# Patient Record
Sex: FEMALE | Race: BLACK OR AFRICAN AMERICAN
[De-identification: names, ages, dates, MRNs, and addresses within clinical notes are randomized per-mention and may not be internally consistent; named-entity substitution may affect disease eponyms.]

---

## 2019-07-02 ENCOUNTER — HOSPITAL ENCOUNTER (EMERGENCY)
Dept: HOSPITAL 62 - ER | Age: 39
Discharge: HOME | End: 2019-07-02
Payer: MEDICARE

## 2019-07-02 VITALS — SYSTOLIC BLOOD PRESSURE: 135 MMHG | DIASTOLIC BLOOD PRESSURE: 96 MMHG

## 2019-07-02 DIAGNOSIS — S05.02XA: Primary | ICD-10-CM

## 2019-07-02 DIAGNOSIS — I10: ICD-10-CM

## 2019-07-02 DIAGNOSIS — H10.9: ICD-10-CM

## 2019-07-02 DIAGNOSIS — E10.9: ICD-10-CM

## 2019-07-02 DIAGNOSIS — I25.10: ICD-10-CM
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
## 2019-07-02 DIAGNOSIS — J45.909: ICD-10-CM

## 2019-07-02 PROCEDURE — 99283 EMERGENCY DEPT VISIT LOW MDM: CPT

## 2019-07-02 NOTE — ER DOCUMENT REPORT
HPI





- HPI


Time Seen by Provider: 07/02/19 13:49


Pain Level: 2


Context: 





Patient is a 39-year-old female who presents the emergency department with left 

eye burning and light sensitivity.  She states about 4 days ago she noticed that

she had crust around her eyes.  Denies any injury.  She does have false 

eyelashes, but states that the burning started before she put her false 

eyelashes in.





- ROS


Notes: 





REVIEW OF SYSTEMS:





CONSTITUTIONAL :    Denies recent illness.  Denies recent unintentional weight 

loss.  Denies fever,  chills, or sweats. 


EENT: See HPI denies ear, throat, or mouth pain, discharge, or symptoms.  Denies

nasal or sinus congestion.


CARDIOVASCULAR:  Denies chest pain.


RESPIRATORY: Denies shortness of breath, cough, congestion, difficulty 

breathing, or wheezing. 


GASTROINTESTINAL: Denies nausea, vomiting, and diarrhea.  Denies abdominal pain.

 Denies constipation. 


GENITOURINARY:  Denies difficulty urinating, burning, blood in urine, urgency or

frequency.


MUSCULOSKELETAL:  Denies neck and back pain.  Denies joint pain or swelling.


SKIN:   Denies rash, itchiness, or lesions


HEMATOLOGIC :   Denies easy bruising or bleeding.


LYMPHATIC:  Denies swollen, painful, enlarged glands.


NEUROLOGICAL: Denies no numbness or tingling denies weakness.  Denies headache. 

Denies altered mental status.  Denies alteration in speech.


PSYCHIATRIC:  Denies stress, anxiety, alteration in sleep patterns, or 

depression.





All other systems reviewed and negative.





- REPRODUCTIVE


Reproductive: DENIES: Pregnant:





Past Medical History





- Social History


Smoking Status: Unknown if Ever Smoked


Family History: Reviewed & Not Pertinent, Other - Mother had migraines





- Past Medical History


Cardiac Medical History: Reports: Hx Coronary Artery Disease, Hx 

Hypercholesterolemia, Hx Hypertension


Pulmonary Medical History: Reports: Hx Asthma


Neurological Medical History: Reports: Hx Cerebrovascular Accident, Hx Migraine


Endocrine Medical History: Reports: Hx Diabetes Mellitus Type 1


Renal/ Medical History: Denies: Hx Peritoneal Dialysis


Skin Medical History: Reports Hx Eczema


Past Surgical History: Reports: Hx Oral Surgery.  Denies: Hx Hysterectomy





- Immunizations


Immunizations up to date: Yes


Hx Diphtheria, Pertussis, Tetanus Vaccination: Yes - 2011


Hx Pneumococcal Vaccination: 01/01/01





Vertical Provider Document





- CONSTITUTIONAL


Notes: 





PHYSICAL EXAMINATION:





GENERAL: Appears well, healthy, well-nourished, no acute distress. 





HEAD:  Normocephalic, atraumatic.





EYES:  PERRL, corneal abrasion noted to 3:00 hour of cornea, all extraocular mov

ements intact, sclera nonicteric





ENT:  Moist mucous membranes. 





PSYCH: Normal mood, normal affect.





SKIN: Warm, dry.  No rash, lesions, ulcerations noted.  Normal skin turgor.





- INFECTION CONTROL


TRAVEL OUTSIDE OF THE [place] IN LAST 30 DAYS: No





Course





- Re-evaluation


Re-evalutation: 





07/02/19 


Woods lamp exam and slit-lamp exam show the patient has a corneal abrasion at 

the 3:00 hour of the cornea.  Negative Joi sign.  I do not suspect patient 

has a globe rupture.  Patient will be started on Polytrim eyedrops and Acular 

eyedrops.  She will follow-up with ophthalmology if needed.  Follow-up 

precautions were given.  Verbal discharge instructions were given to the 

patient.  They verbalized understanding.  They are stable for discharge.








- Vital Signs


Vital signs: 


                                        











Temp Pulse Resp BP Pulse Ox


 


 98.9 F   123 H  18   139/102 H  95 


 


 07/02/19 13:18  07/02/19 13:18  07/02/19 13:18  07/02/19 13:18  07/02/19 13:18














Discharge





- Discharge


Clinical Impression: 


Corneal abrasion


Qualifiers:


 Encounter type: initial encounter Laterality: left Qualified Code(s): S05.02XA 

- Injury of conjunctiva and corneal abrasion without foreign body, left eye, 

initial encounter





Conjunctivitis


Qualifiers:


 Conjunctivitis type: unspecified Laterality: left Qualified Code(s): H10.9 - 

Unspecified conjunctivitis





Condition: Stable


Disposition: HOME, SELF-CARE


Instructions:  Corneal Abrasion ([facility])


Additional Instructions: 


You were seen today in the emergency department for left eye pain.  You have a 

corneal abrasion.  Please apply antibiotic eyedrops 4 times a day for 7 days.  

You are also being prescribed Acular eyedrops.  Take as directed.  Please 

follow-up with ophthalmology if your pain does not get better.


Prescriptions: 


Ketorolac Tromethamine [Acular] 1 drop OS TIDP PRN #5 ml


 PRN Reason: 


Referrals: 


SAEID VOGEL MD [ACTIVE STAFF] - Follow up in 1 week

## 2021-01-13 ENCOUNTER — HOSPITAL ENCOUNTER (EMERGENCY)
Dept: HOSPITAL 62 - ER | Age: 41
Discharge: HOME | End: 2021-01-13
Payer: MEDICARE

## 2021-01-13 VITALS — DIASTOLIC BLOOD PRESSURE: 74 MMHG | SYSTOLIC BLOOD PRESSURE: 132 MMHG

## 2021-01-13 DIAGNOSIS — I25.10: ICD-10-CM

## 2021-01-13 DIAGNOSIS — R53.1: ICD-10-CM

## 2021-01-13 DIAGNOSIS — I69.398: ICD-10-CM

## 2021-01-13 DIAGNOSIS — Z04.3: Primary | ICD-10-CM

## 2021-01-13 DIAGNOSIS — Z79.4: ICD-10-CM

## 2021-01-13 DIAGNOSIS — E78.00: ICD-10-CM

## 2021-01-13 DIAGNOSIS — F17.200: ICD-10-CM

## 2021-01-13 DIAGNOSIS — N39.0: ICD-10-CM

## 2021-01-13 DIAGNOSIS — I10: ICD-10-CM

## 2021-01-13 DIAGNOSIS — F32.9: ICD-10-CM

## 2021-01-13 DIAGNOSIS — Z91.11: ICD-10-CM

## 2021-01-13 DIAGNOSIS — Z79.899: ICD-10-CM

## 2021-01-13 DIAGNOSIS — E10.65: ICD-10-CM

## 2021-01-13 DIAGNOSIS — R00.0: ICD-10-CM

## 2021-01-13 LAB
ADD MANUAL DIFF: NO
ALBUMIN SERPL-MCNC: 4.6 G/DL (ref 3.5–5)
ALP SERPL-CCNC: 104 U/L (ref 38–126)
ANION GAP SERPL CALC-SCNC: 15 MMOL/L (ref 5–19)
APPEARANCE UR: (no result)
APTT PPP: YELLOW S
AST SERPL-CCNC: 20 U/L (ref 14–36)
BARBITURATES UR QL SCN: NEGATIVE
BASOPHILS # BLD AUTO: 0.1 10^3/UL (ref 0–0.2)
BASOPHILS NFR BLD AUTO: 1.2 % (ref 0–2)
BILIRUB DIRECT SERPL-MCNC: 0.3 MG/DL (ref 0–0.4)
BILIRUB SERPL-MCNC: 0.3 MG/DL (ref 0.2–1.3)
BILIRUB UR QL STRIP: NEGATIVE
BUN SERPL-MCNC: 18 MG/DL (ref 7–20)
CALCIUM: 10.4 MG/DL (ref 8.4–10.2)
CHLORIDE SERPL-SCNC: 100 MMOL/L (ref 98–107)
CO2 SERPL-SCNC: 25 MMOL/L (ref 22–30)
EOSINOPHIL # BLD AUTO: 0.1 10^3/UL (ref 0–0.6)
EOSINOPHIL NFR BLD AUTO: 0.8 % (ref 0–6)
ERYTHROCYTE [DISTWIDTH] IN BLOOD BY AUTOMATED COUNT: 13.7 % (ref 11.5–14)
ETHANOL SERPL-MCNC: < 10 MG/DL
GLUCOSE SERPL-MCNC: 535 MG/DL (ref 75–110)
GLUCOSE UR STRIP-MCNC: >=500 MG/DL
HCT VFR BLD CALC: 41.6 % (ref 36–47)
HGB BLD-MCNC: 13.6 G/DL (ref 12–15.5)
KETONES UR STRIP-MCNC: 20 MG/DL
LYMPHOCYTES # BLD AUTO: 2.3 10^3/UL (ref 0.5–4.7)
LYMPHOCYTES NFR BLD AUTO: 25 % (ref 13–45)
MCH RBC QN AUTO: 27.7 PG (ref 27–33.4)
MCHC RBC AUTO-ENTMCNC: 32.7 G/DL (ref 32–36)
MCV RBC AUTO: 85 FL (ref 80–97)
METHADONE UR QL SCN: NEGATIVE
MONOCYTES # BLD AUTO: 0.5 10^3/UL (ref 0.1–1.4)
MONOCYTES NFR BLD AUTO: 5.5 % (ref 3–13)
NEUTROPHILS # BLD AUTO: 6.2 10^3/UL (ref 1.7–8.2)
NEUTS SEG NFR BLD AUTO: 67.5 % (ref 42–78)
NITRITE UR QL STRIP: POSITIVE
PCP UR QL SCN: NEGATIVE
PH UR STRIP: 6 [PH] (ref 5–9)
PLATELET # BLD: 249 10^3/UL (ref 150–450)
POTASSIUM SERPL-SCNC: 4.2 MMOL/L (ref 3.6–5)
PROT SERPL-MCNC: 7.7 G/DL (ref 6.3–8.2)
PROT UR STRIP-MCNC: NEGATIVE MG/DL
RBC # BLD AUTO: 4.91 10^6/UL (ref 3.72–5.28)
SP GR UR STRIP: 1.03
TOTAL CELLS COUNTED % (AUTO): 100 %
URINE AMPHETAMINES SCREEN: NEGATIVE
URINE BENZODIAZEPINES SCREEN: NEGATIVE
URINE COCAINE SCREEN: NEGATIVE
URINE MARIJUANA (THC) SCREEN: NEGATIVE
UROBILINOGEN UR-MCNC: NEGATIVE MG/DL (ref ?–2)
WBC # BLD AUTO: 9.2 10^3/UL (ref 4–10.5)

## 2021-01-13 PROCEDURE — 81001 URINALYSIS AUTO W/SCOPE: CPT

## 2021-01-13 PROCEDURE — 87088 URINE BACTERIA CULTURE: CPT

## 2021-01-13 PROCEDURE — 85025 COMPLETE CBC W/AUTO DIFF WBC: CPT

## 2021-01-13 PROCEDURE — 83735 ASSAY OF MAGNESIUM: CPT

## 2021-01-13 PROCEDURE — 93010 ELECTROCARDIOGRAM REPORT: CPT

## 2021-01-13 PROCEDURE — 70450 CT HEAD/BRAIN W/O DYE: CPT

## 2021-01-13 PROCEDURE — 96361 HYDRATE IV INFUSION ADD-ON: CPT

## 2021-01-13 PROCEDURE — 99285 EMERGENCY DEPT VISIT HI MDM: CPT

## 2021-01-13 PROCEDURE — 93005 ELECTROCARDIOGRAM TRACING: CPT

## 2021-01-13 PROCEDURE — 80307 DRUG TEST PRSMV CHEM ANLYZR: CPT

## 2021-01-13 PROCEDURE — 87186 SC STD MICRODIL/AGAR DIL: CPT

## 2021-01-13 PROCEDURE — 96365 THER/PROPH/DIAG IV INF INIT: CPT

## 2021-01-13 PROCEDURE — 82962 GLUCOSE BLOOD TEST: CPT

## 2021-01-13 PROCEDURE — 80053 COMPREHEN METABOLIC PANEL: CPT

## 2021-01-13 PROCEDURE — 87086 URINE CULTURE/COLONY COUNT: CPT

## 2021-01-13 PROCEDURE — 84703 CHORIONIC GONADOTROPIN ASSAY: CPT

## 2021-01-13 PROCEDURE — 36415 COLL VENOUS BLD VENIPUNCTURE: CPT

## 2021-01-13 NOTE — ER DOCUMENT REPORT
ED Fall





- General


Chief Complaint: Weakness


Stated Complaint: HIGH BLOOD PRESSURE, HEART PALPATATIONS


Time Seen by Provider: 01/13/21 03:34


Primary Care Provider: 


OBED BARBOSA FNP [Primary Care Provider] - Follow up tomorrow


Information source: Patient, Parent


Notes: 





Patient presents after a fall at home this evening.  Patient has a history of 5 

previous strokes and has weaknesses to the bilateral sides although does 

ambulate with the use of a walker.  Patient frequently will not use her walker 

and attempts to ambulate without her walker and will fall.  Mother states that 

patient fell this evening and when she checked her blood pressure the blood 

pressure reading was elevated.  Patient upon presentation to the ER had a more 

normalized blood pressure.  Mother states that she does plan to get a new 

monitor.  Patient presently denies any headache, chest pain, lightheadedness or 

dizziness.  Patient denies any new deficits at this time.  Patient elevated 

blood sugar and states that she may have gotten confused and did not take her 

Lantus.  Patient states that she used to have a continuous blood sugar monitor 

although after a recent hospitalization they discarded her supplies.  Patient 

does not have a glucometer at home and has been unable to monitor her blood 

sugar readings.  Patient does admit to not eating a diabetic diet and has been 

drinking Mountain Dew at home.


TRAVEL OUTSIDE OF THE [place] IN LAST 30 DAYS: No





- HPI


Occurred: Just prior to arrival


Where: Home


Context: Fell from standing


Associated symptoms: None.  denies: Seizure


Quality of pain: No pain


Prehospital interventions: No: C-collar, Backboard





- Related data


Allergies/Adverse Reactions: 


                                        





No Known Allergies Allergy (Verified 01/04/18 13:59)


   








Home Medications: vimpat, coreg, HCTZ, lyrica, cymbalta, ambien, remron, nudexa.

 lipitor, lantaus, insiulin, novalog insulin, ventalin, metformin





Past Medical History





- General


Information source: Patient





- Social History


Smoking Status: Current Every Day Smoker


Frequency of alcohol use: None


Drug Abuse: None


Occupation: none


Lives with: Parents


Family History: Reviewed & Not Pertinent, Other - Mother had migraines





- Past Medical History


Cardiac Medical History: Reports: Hx Coronary Artery Disease, Hx 

Hypercholesterolemia, Hx Hypertension


Pulmonary Medical History: Reports: Hx Asthma


Neurological Medical History: Reports: Hx Cerebrovascular Accident, Hx Migraine


Endocrine Medical History: Reports: Hx Diabetes Mellitus Type 1


Renal/ Medical History: Denies: Hx Peritoneal Dialysis


Skin Medical History: Reports Hx Eczema


Psychiatric Medical History: Reports: Hx Depression


Past Surgical History: Reports: Hx Oral Surgery.  Denies: Hx Hysterectomy





- Immunizations


Immunizations up to date: Yes


Hx Diphtheria, Pertussis, Tetanus Vaccination: Yes - 2011


Hx Pneumococcal Vaccination: 01/01/01





Review of Systems





- Review of Systems


Constitutional: No symptoms reported.  denies: Fever


EENT: No symptoms reported


Cardiovascular: No symptoms reported.  denies: Chest pain


Respiratory: No symptoms reported


Gastrointestinal: No symptoms reported.  denies: Abdominal pain, Vomiting


Genitourinary: Frequency


Female Genitourinary: No symptoms reported


Musculoskeletal: No symptoms reported.  denies: Back pain


Skin: No symptoms reported


Hematologic/Lymphatic: No symptoms reported


Neurological/Psychological: Weakness - Fronek after CVA, no new deficit.  

denies: Lost consciousness, Headaches





Physical Exam





- Vital signs


Vitals: 


                                        











Temp Pulse Resp BP Pulse Ox


 


 98.1 F   119 H  17   111/62   98 


 


 01/13/21 02:24  01/13/21 02:24  01/13/21 02:24  01/13/21 02:24  01/13/21 02:24














- General


General appearance: Appears well, Alert


In distress: None





- HEENT


Head: Normocephalic, Atraumatic


Eyes: Normal


Conjunctiva: Normal


Nasal: Normal


Mouth/Lips: Normal


Mucous membranes: Normal


Neck: Normal, Supple





- Respiratory


Respiratory status: No respiratory distress


Chest status: Nontender


Breath sounds: Normal.  No: Rales, Rhonchi, Stridor, Wheezing


Chest palpation: Normal





- Cardiovascular


Rhythm: Tachycardia


Heart sounds: S1 appreciated, S2 appreciated





- Abdominal


Inspection: Normal


Distension: No distension


Bowel sounds: Normal


Tenderness: Nontender


Organomegaly: No organomegaly





- Back


Back: Normal, Nontender.  No: CVA tenderness, Vertebra tenderness





- Extremities


General upper extremity: Normal inspection


General lower extremity: Normal inspection


Notes: 





No appreciable deficit when comparing strength of bilateral upper and lower 

extremities, patient able to hold extremities off of stretcher against gravity





- Neurological


Neuro grossly intact: Yes


Cognition: Normal


Weston Coma Scale Eye Opening: Spontaneous


Weston Coma Scale Verbal: Oriented


Weston Coma Scale Motor: Obeys Commands


Weston Coma Scale Total: 15





- Psychological


Associated symptoms: Normal affect, Normal mood





- Skin


Skin Temperature: Warm


Skin Moisture: Dry


Skin Color: Normal





Course





- Re-evaluation


Re-evalutation: 





01/13/21 07:24


Patient updated regarding diagnostic test results.  Patient with hyperglycemia 

without any concerns about acidosis.  Patient without any new focal neurologic 

deficits.  CT head without any acute findings.  Patient with nitrite positive 

urinalysis, urine will be cultured and patient started on antibiotics.  Patient 

encouraged to get a glucometer and monitor her blood sugars and to follow-up 

with her primary doctor for a recheck of her blood sugars.  Mother at bedside 

states that she will help patient keep track of her medications so that she is 

compliant with her medicines as well as her diet.  Patient encouraged to use 

walker when ambulating to prevent falls.





- Vital Signs


Vital signs: 


                                        











Temp Pulse Resp BP Pulse Ox


 


 97.7 F   114 H  15   132/74 H  99 


 


 01/13/21 09:02  01/13/21 09:02  01/13/21 09:02  01/13/21 09:02  01/13/21 09:02














- Laboratory Results


Result Diagrams: 


                                 01/13/21 02:52





                                 01/13/21 02:52


Laboratory Results Interpreted: 


                                        











  01/13/21 01/13/21 01/13/21





  02:52 05:13 06:40


 


Glucose  535 H*  


 


POC Glucose   398 H 


 


Calcium  10.4 H  


 


Urine Glucose (UA)    >=500 H


 


Urine Ketones    20 H


 


Urine Nitrite    POSITIVE H


 


Ur Leukocyte Esterase    TRACE H


 


Urine Ascorbic Acid    20 H











01/13/21 09:23





                              Labs- All tests 24 hr











  01/13/21 01/13/21 01/13/21





  02:52 02:52 02:52


 


WBC  9.2  


 


RBC  4.91  


 


Hgb  13.6  


 


Hct  41.6  


 


MCV  85  


 


MCH  27.7  


 


MCHC  32.7  


 


RDW  13.7  


 


Plt Count  249  


 


Lymph % (Auto)  25.0  


 


Mono % (Auto)  5.5  


 


Eos % (Auto)  0.8  


 


Baso % (Auto)  1.2  


 


Absolute Neuts (auto)  6.2  


 


Absolute Lymphs (auto)  2.3  


 


Absolute Monos (auto)  0.5  


 


Absolute Eos (auto)  0.1  


 


Absolute Basos (auto)  0.1  


 


Seg Neutrophils %  67.5  


 


Sodium   140.0 


 


Potassium   4.2 


 


Chloride   100 


 


Carbon Dioxide   25 


 


Anion Gap   15 


 


BUN   18 


 


Creatinine   0.71 


 


Est GFR ( Amer)   > 60 


 


Est GFR (MDRD) Non-Af   > 60 


 


Glucose   535 H* 


 


POC Glucose   


 


Calcium   10.4 H 


 


Magnesium   2.0 


 


Total Bilirubin   0.3 


 


Direct Bilirubin   0.3 


 


Neonat Total Bilirubin   Not Reportable 


 


Neonat Direct Bilirubin   Not Reportable 


 


Neonat Indirect Bili   Not Reportable 


 


AST   20 


 


ALT   28 


 


Alkaline Phosphatase   104 


 


Total Protein   7.7 


 


Albumin   4.6 


 


Serum HCG, Qual    NEGATIVE


 


Urine Color   


 


Urine Appearance   


 


Urine pH   


 


Ur Specific Gravity   


 


Urine Protein   


 


Urine Glucose (UA)   


 


Urine Ketones   


 


Urine Blood   


 


Urine Nitrite   


 


Urine Bilirubin   


 


Urine Urobilinogen   


 


Ur Leukocyte Esterase   


 


Urine WBC (Auto)   


 


Urine RBC (Auto)   


 


Urine Bacteria (Auto)   


 


Squamous Epi Cells Auto   


 


Urine Mucus (Auto)   


 


Urine Ascorbic Acid   


 


Urine Opiates Screen   


 


Urine Methadone Screen   


 


Ur Barbiturates Screen   


 


Ur Phencyclidine Scrn   


 


Ur Amphetamines Screen   


 


U Benzodiazepines Scrn   


 


Urine Cocaine Screen   


 


U Marijuana (THC) Screen   


 


Serum Alcohol   < 10 














  01/13/21 01/13/21 01/13/21





  05:13 06:40 06:40


 


WBC   


 


RBC   


 


Hgb   


 


Hct   


 


MCV   


 


MCH   


 


MCHC   


 


RDW   


 


Plt Count   


 


Lymph % (Auto)   


 


Mono % (Auto)   


 


Eos % (Auto)   


 


Baso % (Auto)   


 


Absolute Neuts (auto)   


 


Absolute Lymphs (auto)   


 


Absolute Monos (auto)   


 


Absolute Eos (auto)   


 


Absolute Basos (auto)   


 


Seg Neutrophils %   


 


Sodium   


 


Potassium   


 


Chloride   


 


Carbon Dioxide   


 


Anion Gap   


 


BUN   


 


Creatinine   


 


Est GFR ( Amer)   


 


Est GFR (MDRD) Non-Af   


 


Glucose   


 


POC Glucose  398 H  


 


Calcium   


 


Magnesium   


 


Total Bilirubin   


 


Direct Bilirubin   


 


Neonat Total Bilirubin   


 


Neonat Direct Bilirubin   


 


Neonat Indirect Bili   


 


AST   


 


ALT   


 


Alkaline Phosphatase   


 


Total Protein   


 


Albumin   


 


Serum HCG, Qual   


 


Urine Color   YELLOW 


 


Urine Appearance   SLIGHTLY-CLOUDY 


 


Urine pH   6.0 


 


Ur Specific Gravity   1.030 


 


Urine Protein   NEGATIVE 


 


Urine Glucose (UA)   >=500 H 


 


Urine Ketones   20 H 


 


Urine Blood   NEGATIVE 


 


Urine Nitrite   POSITIVE H 


 


Urine Bilirubin   NEGATIVE 


 


Urine Urobilinogen   NEGATIVE 


 


Ur Leukocyte Esterase   TRACE H 


 


Urine WBC (Auto)   15 


 


Urine RBC (Auto)   1 


 


Urine Bacteria (Auto)   TRACE 


 


Squamous Epi Cells Auto   <1 


 


Urine Mucus (Auto)   RARE 


 


Urine Ascorbic Acid   20 H 


 


Urine Opiates Screen    NEGATIVE


 


Urine Methadone Screen    NEGATIVE


 


Ur Barbiturates Screen    NEGATIVE


 


Ur Phencyclidine Scrn    NEGATIVE


 


Ur Amphetamines Screen    NEGATIVE


 


U Benzodiazepines Scrn    NEGATIVE


 


Urine Cocaine Screen    NEGATIVE


 


U Marijuana (THC) Screen    NEGATIVE


 


Serum Alcohol   











Critical Laboratory Results Reviewed: No Critical Results





- Radiology Results


Critical Radiology Results Reviewed: No Critical Results





- EKG Interpretation by Me


EKG shows normal: Sinus rhythm


Rate: Tachycardia


When compared to previous EKG there are: No significant change


Additional EKG results interpreted by me: 





01/13/21 09:23


Sinus tachycardia with a rate of 111, QTc 457, no acute ischemic changes





Discharge





- Discharge


Clinical Impression: 


 Hyperglycemia





Hemiparesis


Qualifiers:


 Hemiparesis etiology: late effect of cerebrovascular disease Cerebrovascular 

disease type: unspecified Hemiparesis laterality: unspecified Qualified Code(s):

I69.959 - Hemiplegia and hemiparesis following unspecified cerebrovascular 

disease affecting unspecified side





Fall


Qualifiers:


 Encounter type: initial encounter Qualified Code(s): W19.XXXA - Unspecified 

fall, initial encounter





UTI (urinary tract infection)


Qualifiers:


 Urinary tract infection type: site unspecified Hematuria presence: without 

hematuria Qualified Code(s): N39.0 - Urinary tract infection, site not specified





Condition: Stable


Disposition: HOME, SELF-CARE


Instructions:  Cephalexin ([facility]), Dehydration ([facility]), Hyperglycemia ([facility]), 

Intravenous (IV) Fluids ([facility]), Urinary Tract Infection ([facility])


Additional Instructions: 


Return immediately for any new or worsening symptoms





Followup with your primary care provider, call tomorrow to make a followup 

appointment





Maintain a diabetic diet and monitor blood sugars. 





Urine culture is pending, we will call if you need any different treatment.


Prescriptions: 


Blood-Glucose Meter [Blood Glucose Meter] 1 unit MC DAILY PRN #1 unit


 PRN Reason: 


Cephalexin Monohydrate [Keflex 500 mg Capsule] 500 mg PO BID 7 Days #14 capsule


Referrals: 


OBED BARBOSA FNP [Primary Care Provider] - Follow up tomorrow

## 2021-01-13 NOTE — EKG REPORT
SEVERITY:- ABNORMAL ECG -

SINUS TACHYCARDIA

PROBABLE LVH WITH SECONDARY REPOL ABNRM

:

Confirmed by: Niurka Dotson MD 13-Jan-2021 10:25:18

## 2021-01-13 NOTE — RADIOLOGY REPORT ([facility])
CT head without contrast on 1/13/2021 at 4:55 AM 



CLINICAL INDICATION: Frequent falls, history of CVA, per protocol

for mechanism of injury



TECHNIQUE: Multiple axial images are obtained throughout the head

without the administration of contrast. This exam was performed

according to our departmental dose-optimization program, which

includes automated exposure control, adjustment of the mA and/or

kV according to patient size and/or use of iterative

reconstruction technique.

Total DLP is 1203.4 mGy*cm.



COMPARISON: 4/26/2018



FINDINGS: There is low-density in the periventricular white

matter consistent with chronic small vessel ischemic changes.

There is an old right frontal infarct with some ex vacuo

dilatation of the right lateral ventricle. There is also a likely

small old left periventricular lacunar infarct. No definite CT

evidence of acute infarct is noted. There is no hemorrhage. There

are no abnormal extra-axial fluid collections. There is no mass,

mass effect or midline shift. No bony abnormality is noted.



IMPRESSION: Chronic small vessel ischemic changes with old

infarcts with no acute intracranial abnormality.